# Patient Record
Sex: MALE | Race: WHITE | Employment: OTHER | URBAN - METROPOLITAN AREA
[De-identification: names, ages, dates, MRNs, and addresses within clinical notes are randomized per-mention and may not be internally consistent; named-entity substitution may affect disease eponyms.]

---

## 2020-01-01 ENCOUNTER — APPOINTMENT (OUTPATIENT)
Dept: GENERAL RADIOLOGY | Age: 75
End: 2020-01-01
Attending: EMERGENCY MEDICINE
Payer: MEDICARE

## 2020-01-01 ENCOUNTER — APPOINTMENT (OUTPATIENT)
Dept: MRI IMAGING | Age: 75
End: 2020-01-01
Attending: INTERNAL MEDICINE
Payer: MEDICARE

## 2020-01-01 ENCOUNTER — HOSPITAL ENCOUNTER (OUTPATIENT)
Age: 75
Setting detail: OBSERVATION
Discharge: PSYCHIATRIC OR SUBSTANCE USE FACILITY | End: 2020-12-18
Attending: EMERGENCY MEDICINE | Admitting: INTERNAL MEDICINE
Payer: MEDICARE

## 2020-01-01 ENCOUNTER — APPOINTMENT (OUTPATIENT)
Dept: CT IMAGING | Age: 75
End: 2020-01-01
Attending: EMERGENCY MEDICINE
Payer: MEDICARE

## 2020-01-01 VITALS
RESPIRATION RATE: 18 BRPM | TEMPERATURE: 97.7 F | HEART RATE: 75 BPM | HEIGHT: 73 IN | SYSTOLIC BLOOD PRESSURE: 158 MMHG | WEIGHT: 149.9 LBS | BODY MASS INDEX: 19.87 KG/M2 | OXYGEN SATURATION: 97 % | DIASTOLIC BLOOD PRESSURE: 66 MMHG

## 2020-01-01 DIAGNOSIS — R40.0 SOMNOLENCE: Primary | ICD-10-CM

## 2020-01-01 DIAGNOSIS — R41.82 ALTERED MENTAL STATUS, UNSPECIFIED ALTERED MENTAL STATUS TYPE: ICD-10-CM

## 2020-01-01 LAB
7-AMINOCLONAZEPAM: NEGATIVE NG/ML
ALBUMIN SERPL-MCNC: 2.6 G/DL (ref 3.4–5)
ALBUMIN SERPL-MCNC: 3 G/DL (ref 3.4–5)
ALBUMIN SERPL-MCNC: 3 G/DL (ref 3.4–5)
ALBUMIN/GLOB SERPL: 1 {RATIO} (ref 0.8–1.7)
ALBUMIN/GLOB SERPL: 1 {RATIO} (ref 0.8–1.7)
ALBUMIN/GLOB SERPL: 1.1 {RATIO} (ref 0.8–1.7)
ALP SERPL-CCNC: 56 U/L (ref 45–117)
ALP SERPL-CCNC: 56 U/L (ref 45–117)
ALP SERPL-CCNC: 70 U/L (ref 45–117)
ALPRAZOLAM, 763914: NEGATIVE NG/ML
ALT SERPL-CCNC: 38 U/L (ref 16–61)
ALT SERPL-CCNC: 44 U/L (ref 16–61)
ALT SERPL-CCNC: 67 U/L (ref 16–61)
AMMONIA PLAS-SCNC: 19 UMOL/L (ref 11–32)
AMPHET UR QL SCN: NEGATIVE
AMPHETAMINES SERPL QL SCN: NEGATIVE NG/ML
ANION GAP SERPL CALC-SCNC: 4 MMOL/L (ref 3–18)
ANION GAP SERPL CALC-SCNC: 5 MMOL/L (ref 3–18)
ANION GAP SERPL CALC-SCNC: 6 MMOL/L (ref 3–18)
ANION GAP SERPL CALC-SCNC: 6 MMOL/L (ref 3–18)
APAP SERPL-MCNC: <2 UG/ML (ref 10–30)
APAP SERPL-MCNC: <2 UG/ML (ref 10–30)
APPEARANCE UR: CLEAR
ARTERIAL PATENCY WRIST A: YES
AST SERPL-CCNC: 14 U/L (ref 10–38)
AST SERPL-CCNC: 19 U/L (ref 10–38)
AST SERPL-CCNC: 64 U/L (ref 10–38)
ATRIAL RATE: 77 BPM
BACTERIA SPEC CULT: ABNORMAL
BACTERIA SPEC CULT: NORMAL
BARBITURATES SERPL QL SCN: NEGATIVE UG/ML
BARBITURATES UR QL SCN: NEGATIVE
BASE EXCESS BLD CALC-SCNC: 3 MMOL/L
BASOPHILS # BLD: 0 K/UL (ref 0–0.1)
BASOPHILS NFR BLD: 0 % (ref 0–2)
BDY SITE: ABNORMAL
BENZODIAZ SERPL QL SCN: ABNORMAL NG/ML
BENZODIAZ UR QL: NEGATIVE
BENZODIAZEPINES CONFIRM: POSITIVE
BILIRUB DIRECT SERPL-MCNC: 0.1 MG/DL (ref 0–0.2)
BILIRUB SERPL-MCNC: 0.4 MG/DL (ref 0.2–1)
BILIRUB SERPL-MCNC: 0.6 MG/DL (ref 0.2–1)
BILIRUB SERPL-MCNC: 0.6 MG/DL (ref 0.2–1)
BILIRUB UR QL: NEGATIVE
BODY TEMPERATURE: 96.9
BUN SERPL-MCNC: 15 MG/DL (ref 7–18)
BUN SERPL-MCNC: 15 MG/DL (ref 7–18)
BUN SERPL-MCNC: 18 MG/DL (ref 7–18)
BUN SERPL-MCNC: 19 MG/DL (ref 7–18)
BUN/CREAT SERPL: 19 (ref 12–20)
BUN/CREAT SERPL: 20 (ref 12–20)
BUN/CREAT SERPL: 20 (ref 12–20)
BUN/CREAT SERPL: 23 (ref 12–20)
CALCIUM SERPL-MCNC: 8.3 MG/DL (ref 8.5–10.1)
CALCIUM SERPL-MCNC: 8.5 MG/DL (ref 8.5–10.1)
CALCIUM SERPL-MCNC: 8.5 MG/DL (ref 8.5–10.1)
CALCIUM SERPL-MCNC: 8.7 MG/DL (ref 8.5–10.1)
CALCULATED P AXIS, ECG09: 33 DEGREES
CALCULATED R AXIS, ECG10: 28 DEGREES
CALCULATED T AXIS, ECG11: 66 DEGREES
CANNABINOIDS SERPL QL SCN: NEGATIVE NG/ML
CANNABINOIDS UR QL SCN: NEGATIVE
CHLORDIAZEPOXIDE, 716035: NEGATIVE NG/ML
CHLORIDE SERPL-SCNC: 105 MMOL/L (ref 100–111)
CHLORIDE SERPL-SCNC: 106 MMOL/L (ref 100–111)
CHLORIDE SERPL-SCNC: 107 MMOL/L (ref 100–111)
CHLORIDE SERPL-SCNC: 109 MMOL/L (ref 100–111)
CHOLEST SERPL-MCNC: 162 MG/DL
CK MB CFR SERPL CALC: NORMAL % (ref 0–4)
CK MB SERPL-MCNC: <1 NG/ML (ref 5–25)
CK SERPL-CCNC: 65 U/L (ref 39–308)
CLONAZEPAM, 763916: NEGATIVE NG/ML
CO2 SERPL-SCNC: 24 MMOL/L (ref 21–32)
CO2 SERPL-SCNC: 25 MMOL/L (ref 21–32)
CO2 SERPL-SCNC: 27 MMOL/L (ref 21–32)
CO2 SERPL-SCNC: 28 MMOL/L (ref 21–32)
COCAINE UR QL SCN: NEGATIVE
COCAINE+BZE SERPL QL SCN: NEGATIVE NG/ML
COLOR UR: YELLOW
COVID-19 RAPID TEST, COVR: NOT DETECTED
CREAT SERPL-MCNC: 0.76 MG/DL (ref 0.6–1.3)
CREAT SERPL-MCNC: 0.78 MG/DL (ref 0.6–1.3)
CREAT SERPL-MCNC: 0.79 MG/DL (ref 0.6–1.3)
CREAT SERPL-MCNC: 0.97 MG/DL (ref 0.6–1.3)
DESALKYLFLURAZEPAM, 767601: NEGATIVE NG/ML
DESMETHYLCHLORDIAZEPOXIDE, 810910: NEGATIVE NG/ML
DESMETHYLDIAZEPAM, RMBN16: NEGATIVE NG/ML
DIAGNOSIS, 93000: NORMAL
DIAZEPAM, 810905: NEGATIVE NG/ML
DIFFERENTIAL METHOD BLD: ABNORMAL
EOSINOPHIL # BLD: 0.1 K/UL (ref 0–0.4)
EOSINOPHIL # BLD: 0.1 K/UL (ref 0–0.4)
EOSINOPHIL # BLD: 0.2 K/UL (ref 0–0.4)
EOSINOPHIL NFR BLD: 1 % (ref 0–5)
EOSINOPHIL NFR BLD: 1 % (ref 0–5)
EOSINOPHIL NFR BLD: 2 % (ref 0–5)
ERYTHROCYTE [DISTWIDTH] IN BLOOD BY AUTOMATED COUNT: 14.1 % (ref 11.6–14.5)
ERYTHROCYTE [DISTWIDTH] IN BLOOD BY AUTOMATED COUNT: 14.3 % (ref 11.6–14.5)
ERYTHROCYTE [DISTWIDTH] IN BLOOD BY AUTOMATED COUNT: 14.4 % (ref 11.6–14.5)
ERYTHROCYTE [DISTWIDTH] IN BLOOD BY AUTOMATED COUNT: 14.6 % (ref 11.6–14.5)
EST. AVERAGE GLUCOSE BLD GHB EST-MCNC: 114 MG/DL
ETHANOL SERPL-MCNC: <3 MG/DL (ref 0–3)
FLURAZEPAM, 790417: NEGATIVE NG/ML
GAS FLOW.O2 O2 DELIVERY SYS: ABNORMAL L/MIN
GAS FLOW.O2 SETTING OXYMISER: 4 L/M
GLOBULIN SER CALC-MCNC: 2.5 G/DL (ref 2–4)
GLOBULIN SER CALC-MCNC: 2.7 G/DL (ref 2–4)
GLOBULIN SER CALC-MCNC: 2.9 G/DL (ref 2–4)
GLUCOSE BLD STRIP.AUTO-MCNC: 117 MG/DL (ref 70–110)
GLUCOSE BLD STRIP.AUTO-MCNC: 118 MG/DL (ref 70–110)
GLUCOSE BLD STRIP.AUTO-MCNC: 121 MG/DL (ref 70–110)
GLUCOSE BLD STRIP.AUTO-MCNC: 121 MG/DL (ref 70–110)
GLUCOSE BLD STRIP.AUTO-MCNC: 125 MG/DL (ref 70–110)
GLUCOSE BLD STRIP.AUTO-MCNC: 127 MG/DL (ref 70–110)
GLUCOSE BLD STRIP.AUTO-MCNC: 128 MG/DL (ref 70–110)
GLUCOSE BLD STRIP.AUTO-MCNC: 132 MG/DL (ref 70–110)
GLUCOSE BLD STRIP.AUTO-MCNC: 136 MG/DL (ref 70–110)
GLUCOSE BLD STRIP.AUTO-MCNC: 143 MG/DL (ref 70–110)
GLUCOSE SERPL-MCNC: 109 MG/DL (ref 74–99)
GLUCOSE SERPL-MCNC: 111 MG/DL (ref 74–99)
GLUCOSE SERPL-MCNC: 91 MG/DL (ref 74–99)
GLUCOSE SERPL-MCNC: 98 MG/DL (ref 74–99)
GLUCOSE UR STRIP.AUTO-MCNC: NEGATIVE MG/DL
GRAM STN SPEC: ABNORMAL
GRAM STN SPEC: ABNORMAL
HBA1C MFR BLD: 5.6 % (ref 4.2–5.6)
HCO3 BLD-SCNC: 28.4 MMOL/L (ref 22–26)
HCT VFR BLD AUTO: 35.8 % (ref 36–48)
HCT VFR BLD AUTO: 36.1 % (ref 36–48)
HCT VFR BLD AUTO: 38.6 % (ref 36–48)
HCT VFR BLD AUTO: 39.1 % (ref 36–48)
HDLC SERPL-MCNC: 65 MG/DL (ref 40–60)
HDLC SERPL: 2.5 {RATIO} (ref 0–5)
HDSCOM,HDSCOM: NORMAL
HGB BLD-MCNC: 11.5 G/DL (ref 13–16)
HGB BLD-MCNC: 11.7 G/DL (ref 13–16)
HGB BLD-MCNC: 12.6 G/DL (ref 13–16)
HGB BLD-MCNC: 12.6 G/DL (ref 13–16)
HGB UR QL STRIP: NEGATIVE
INR PPP: 1 (ref 0.8–1.2)
INR PPP: 1.2 (ref 0.8–1.2)
KETONES UR QL STRIP.AUTO: NEGATIVE MG/DL
LACTATE BLD-SCNC: 0.61 MMOL/L (ref 0.4–2)
LDLC SERPL CALC-MCNC: 77.6 MG/DL (ref 0–100)
LEUKOCYTE ESTERASE UR QL STRIP.AUTO: NEGATIVE
LIPID PROFILE,FLP: ABNORMAL
LORAZEPAM, 763912: 78.8 NG/ML
LYMPHOCYTES # BLD: 1.4 K/UL (ref 0.9–3.6)
LYMPHOCYTES # BLD: 1.5 K/UL (ref 0.9–3.6)
LYMPHOCYTES # BLD: 2.5 K/UL (ref 0.9–3.6)
LYMPHOCYTES NFR BLD: 12 % (ref 21–52)
LYMPHOCYTES NFR BLD: 14 % (ref 21–52)
LYMPHOCYTES NFR BLD: 29 % (ref 21–52)
MAGNESIUM SERPL-MCNC: 2 MG/DL (ref 1.6–2.6)
MAGNESIUM SERPL-MCNC: 2.6 MG/DL (ref 1.6–2.6)
MCH RBC QN AUTO: 29.8 PG (ref 24–34)
MCH RBC QN AUTO: 30.1 PG (ref 24–34)
MCH RBC QN AUTO: 30.3 PG (ref 24–34)
MCH RBC QN AUTO: 30.4 PG (ref 24–34)
MCHC RBC AUTO-ENTMCNC: 32.1 G/DL (ref 31–37)
MCHC RBC AUTO-ENTMCNC: 32.2 G/DL (ref 31–37)
MCHC RBC AUTO-ENTMCNC: 32.4 G/DL (ref 31–37)
MCHC RBC AUTO-ENTMCNC: 32.6 G/DL (ref 31–37)
MCV RBC AUTO: 91.9 FL (ref 74–97)
MCV RBC AUTO: 93.2 FL (ref 74–97)
MCV RBC AUTO: 93.7 FL (ref 74–97)
MCV RBC AUTO: 94 FL (ref 74–97)
MEPERIDINE, DR296L: NEGATIVE NG/ML
METHADONE SERPL QL SCN: NEGATIVE NG/ML
METHADONE UR QL: NEGATIVE
MIDAZOLAM, 763922: NEGATIVE NG/ML
MONOCYTES # BLD: 0.5 K/UL (ref 0.05–1.2)
MONOCYTES # BLD: 0.8 K/UL (ref 0.05–1.2)
MONOCYTES # BLD: 0.9 K/UL (ref 0.05–1.2)
MONOCYTES NFR BLD: 4 % (ref 3–10)
MONOCYTES NFR BLD: 9 % (ref 3–10)
MONOCYTES NFR BLD: 9 % (ref 3–10)
NEUTS SEG # BLD: 5.2 K/UL (ref 1.8–8)
NEUTS SEG # BLD: 8.4 K/UL (ref 1.8–8)
NEUTS SEG # BLD: 9.9 K/UL (ref 1.8–8)
NEUTS SEG NFR BLD: 60 % (ref 40–73)
NEUTS SEG NFR BLD: 76 % (ref 40–73)
NEUTS SEG NFR BLD: 83 % (ref 40–73)
NITRITE UR QL STRIP.AUTO: NEGATIVE
OPIATES SERPL QL SCN: NEGATIVE NG/ML
OPIATES UR QL: NEGATIVE
OPIATES, 790423: NEGATIVE NG/ML
OXAZEPAM CONFIRM, 763908: NEGATIVE NG/ML
OXYCODONE, 790407: NEGATIVE NG/ML
P-R INTERVAL, ECG05: 136 MS
PCO2 BLD: 47.4 MMHG (ref 35–45)
PCP SERPL QL SCN: NEGATIVE NG/ML
PCP UR QL: NEGATIVE
PH BLD: 7.38 [PH] (ref 7.35–7.45)
PH UR STRIP: 7.5 [PH] (ref 5–8)
PHOSPHATE SERPL-MCNC: 3.5 MG/DL (ref 2.5–4.9)
PLATELET # BLD AUTO: 252 K/UL (ref 135–420)
PLATELET # BLD AUTO: 259 K/UL (ref 135–420)
PLATELET # BLD AUTO: 271 K/UL (ref 135–420)
PLATELET # BLD AUTO: 287 K/UL (ref 135–420)
PMV BLD AUTO: 9.4 FL (ref 9.2–11.8)
PMV BLD AUTO: 9.6 FL (ref 9.2–11.8)
PMV BLD AUTO: 9.7 FL (ref 9.2–11.8)
PMV BLD AUTO: 9.9 FL (ref 9.2–11.8)
PO2 BLD: 221 MMHG (ref 80–100)
POTASSIUM SERPL-SCNC: 3.7 MMOL/L (ref 3.5–5.5)
POTASSIUM SERPL-SCNC: 3.9 MMOL/L (ref 3.5–5.5)
POTASSIUM SERPL-SCNC: 4 MMOL/L (ref 3.5–5.5)
POTASSIUM SERPL-SCNC: 4.3 MMOL/L (ref 3.5–5.5)
PROPOXYPH SERPL QL SCN: NEGATIVE NG/ML
PROT SERPL-MCNC: 5.1 G/DL (ref 6.4–8.2)
PROT SERPL-MCNC: 5.7 G/DL (ref 6.4–8.2)
PROT SERPL-MCNC: 5.9 G/DL (ref 6.4–8.2)
PROT UR STRIP-MCNC: NEGATIVE MG/DL
PROTHROMBIN TIME: 13.2 SEC (ref 11.5–15.2)
PROTHROMBIN TIME: 14.7 SEC (ref 11.5–15.2)
Q-T INTERVAL, ECG07: 390 MS
QRS DURATION, ECG06: 94 MS
QTC CALCULATION (BEZET), ECG08: 441 MS
RBC # BLD AUTO: 3.82 M/UL (ref 4.7–5.5)
RBC # BLD AUTO: 3.93 M/UL (ref 4.7–5.5)
RBC # BLD AUTO: 4.14 M/UL (ref 4.7–5.5)
RBC # BLD AUTO: 4.16 M/UL (ref 4.7–5.5)
SALICYLATES SERPL-MCNC: <1.7 MG/DL (ref 2.8–20)
SAO2 % BLD: 100 % (ref 92–97)
SERVICE CMNT-IMP: ABNORMAL
SERVICE CMNT-IMP: ABNORMAL
SERVICE CMNT-IMP: NORMAL
SODIUM SERPL-SCNC: 136 MMOL/L (ref 136–145)
SODIUM SERPL-SCNC: 138 MMOL/L (ref 136–145)
SODIUM SERPL-SCNC: 139 MMOL/L (ref 136–145)
SODIUM SERPL-SCNC: 139 MMOL/L (ref 136–145)
SOURCE, COVRS: NORMAL
SP GR UR REFRACTOMETRY: 1.01 (ref 1–1.03)
SPECIMEN TYPE, XMCV1T: NORMAL
SPECIMEN TYPE: ABNORMAL
TEMAZEPAM, 763918: NEGATIVE NG/ML
TOTAL RESP. RATE, ITRR: 12
TRAMADOL, DR297L: NEGATIVE NG/ML
TRIAZOLAM, 763920: NEGATIVE NG/ML
TRIGL SERPL-MCNC: 97 MG/DL (ref ?–150)
TROPONIN I SERPL-MCNC: <0.02 NG/ML (ref 0–0.04)
TSH SERPL DL<=0.05 MIU/L-ACNC: 0.78 UIU/ML (ref 0.36–3.74)
UROBILINOGEN UR QL STRIP.AUTO: 0.2 EU/DL (ref 0.2–1)
VENTRICULAR RATE, ECG03: 77 BPM
VLDLC SERPL CALC-MCNC: 19.4 MG/DL
WBC # BLD AUTO: 10.9 K/UL (ref 4.6–13.2)
WBC # BLD AUTO: 11.9 K/UL (ref 4.6–13.2)
WBC # BLD AUTO: 8.7 K/UL (ref 4.6–13.2)
WBC # BLD AUTO: 8.7 K/UL (ref 4.6–13.2)

## 2020-01-01 PROCEDURE — 80048 BASIC METABOLIC PNL TOTAL CA: CPT

## 2020-01-01 PROCEDURE — 80307 DRUG TEST PRSMV CHEM ANLYZR: CPT

## 2020-01-01 PROCEDURE — 74011250637 HC RX REV CODE- 250/637: Performed by: INTERNAL MEDICINE

## 2020-01-01 PROCEDURE — 85027 COMPLETE CBC AUTOMATED: CPT

## 2020-01-01 PROCEDURE — 36415 COLL VENOUS BLD VENIPUNCTURE: CPT

## 2020-01-01 PROCEDURE — 87635 SARS-COV-2 COVID-19 AMP PRB: CPT

## 2020-01-01 PROCEDURE — 80053 COMPREHEN METABOLIC PANEL: CPT

## 2020-01-01 PROCEDURE — 87040 BLOOD CULTURE FOR BACTERIA: CPT

## 2020-01-01 PROCEDURE — 74011250636 HC RX REV CODE- 250/636: Performed by: INTERNAL MEDICINE

## 2020-01-01 PROCEDURE — 99218 HC RM OBSERVATION: CPT

## 2020-01-01 PROCEDURE — 74011250636 HC RX REV CODE- 250/636: Performed by: HOSPITALIST

## 2020-01-01 PROCEDURE — 83036 HEMOGLOBIN GLYCOSYLATED A1C: CPT

## 2020-01-01 PROCEDURE — 70551 MRI BRAIN STEM W/O DYE: CPT

## 2020-01-01 PROCEDURE — 71045 X-RAY EXAM CHEST 1 VIEW: CPT

## 2020-01-01 PROCEDURE — 82962 GLUCOSE BLOOD TEST: CPT

## 2020-01-01 PROCEDURE — 84100 ASSAY OF PHOSPHORUS: CPT

## 2020-01-01 PROCEDURE — 65660000001 HC RM ICU INTERMED STEPDOWN

## 2020-01-01 PROCEDURE — 80347 BENZODIAZEPINES 13 OR MORE: CPT

## 2020-01-01 PROCEDURE — 82140 ASSAY OF AMMONIA: CPT

## 2020-01-01 PROCEDURE — 83735 ASSAY OF MAGNESIUM: CPT

## 2020-01-01 PROCEDURE — 36600 WITHDRAWAL OF ARTERIAL BLOOD: CPT

## 2020-01-01 PROCEDURE — 83605 ASSAY OF LACTIC ACID: CPT

## 2020-01-01 PROCEDURE — 85610 PROTHROMBIN TIME: CPT

## 2020-01-01 PROCEDURE — 2709999900 HC NON-CHARGEABLE SUPPLY

## 2020-01-01 PROCEDURE — 77030021352 HC CBL LD SYS DISP COVD -B

## 2020-01-01 PROCEDURE — 84443 ASSAY THYROID STIM HORMONE: CPT

## 2020-01-01 PROCEDURE — 90471 IMMUNIZATION ADMIN: CPT

## 2020-01-01 PROCEDURE — 65660000000 HC RM CCU STEPDOWN

## 2020-01-01 PROCEDURE — 85025 COMPLETE CBC W/AUTO DIFF WBC: CPT

## 2020-01-01 PROCEDURE — 81003 URINALYSIS AUTO W/O SCOPE: CPT

## 2020-01-01 PROCEDURE — 82550 ASSAY OF CK (CPK): CPT

## 2020-01-01 PROCEDURE — 96372 THER/PROPH/DIAG INJ SC/IM: CPT

## 2020-01-01 PROCEDURE — 70450 CT HEAD/BRAIN W/O DYE: CPT

## 2020-01-01 PROCEDURE — 97162 PT EVAL MOD COMPLEX 30 MIN: CPT

## 2020-01-01 PROCEDURE — 99285 EMERGENCY DEPT VISIT HI MDM: CPT

## 2020-01-01 PROCEDURE — 87077 CULTURE AEROBIC IDENTIFY: CPT

## 2020-01-01 PROCEDURE — 65270000029 HC RM PRIVATE

## 2020-01-01 PROCEDURE — 93005 ELECTROCARDIOGRAM TRACING: CPT

## 2020-01-01 PROCEDURE — 51702 INSERT TEMP BLADDER CATH: CPT

## 2020-01-01 PROCEDURE — 74011250637 HC RX REV CODE- 250/637: Performed by: HOSPITALIST

## 2020-01-01 PROCEDURE — 90686 IIV4 VACC NO PRSV 0.5 ML IM: CPT | Performed by: INTERNAL MEDICINE

## 2020-01-01 PROCEDURE — 80061 LIPID PANEL: CPT

## 2020-01-01 PROCEDURE — 82803 BLOOD GASES ANY COMBINATION: CPT

## 2020-01-01 PROCEDURE — 80076 HEPATIC FUNCTION PANEL: CPT

## 2020-01-01 RX ORDER — DOCUSATE SODIUM 100 MG/1
200 CAPSULE, LIQUID FILLED ORAL DAILY
Status: DISCONTINUED | OUTPATIENT
Start: 2020-01-01 | End: 2020-01-01 | Stop reason: HOSPADM

## 2020-01-01 RX ORDER — PANTOPRAZOLE SODIUM 40 MG/1
40 TABLET, DELAYED RELEASE ORAL
Status: DISCONTINUED | OUTPATIENT
Start: 2020-01-01 | End: 2020-01-01 | Stop reason: HOSPADM

## 2020-01-01 RX ORDER — INSULIN LISPRO 100 [IU]/ML
INJECTION, SOLUTION INTRAVENOUS; SUBCUTANEOUS
Status: DISCONTINUED | OUTPATIENT
Start: 2020-01-01 | End: 2020-01-01 | Stop reason: HOSPADM

## 2020-01-01 RX ORDER — SODIUM CHLORIDE 9 MG/ML
125 INJECTION, SOLUTION INTRAVENOUS CONTINUOUS
Status: DISPENSED | OUTPATIENT
Start: 2020-01-01 | End: 2020-01-01

## 2020-01-01 RX ORDER — LORAZEPAM 1 MG/1
1 TABLET ORAL ONCE
Status: COMPLETED | OUTPATIENT
Start: 2020-01-01 | End: 2020-01-01

## 2020-01-01 RX ORDER — GUAIFENESIN 100 MG/5ML
81 LIQUID (ML) ORAL DAILY
Status: DISCONTINUED | OUTPATIENT
Start: 2020-01-01 | End: 2020-01-01 | Stop reason: HOSPADM

## 2020-01-01 RX ORDER — SODIUM CHLORIDE 0.9 % (FLUSH) 0.9 %
5-40 SYRINGE (ML) INJECTION AS NEEDED
Status: DISCONTINUED | OUTPATIENT
Start: 2020-01-01 | End: 2020-01-01 | Stop reason: HOSPADM

## 2020-01-01 RX ORDER — ATORVASTATIN CALCIUM 10 MG/1
10 TABLET, FILM COATED ORAL
Qty: 30 TAB | Refills: 0 | Status: SHIPPED | OUTPATIENT
Start: 2020-01-01 | End: 2021-01-17

## 2020-01-01 RX ORDER — MELATONIN
1000 DAILY
COMMUNITY

## 2020-01-01 RX ORDER — GUAIFENESIN 100 MG/5ML
81 LIQUID (ML) ORAL DAILY
Status: SHIPPED | COMMUNITY
Start: 2020-01-01

## 2020-01-01 RX ORDER — DEXTROSE MONOHYDRATE 25 G/50ML
25-50 INJECTION, SOLUTION INTRAVENOUS AS NEEDED
Status: DISCONTINUED | OUTPATIENT
Start: 2020-01-01 | End: 2020-01-01 | Stop reason: SDUPTHER

## 2020-01-01 RX ORDER — ATORVASTATIN CALCIUM 10 MG/1
10 TABLET, FILM COATED ORAL
Status: DISCONTINUED | OUTPATIENT
Start: 2020-01-01 | End: 2020-01-01 | Stop reason: HOSPADM

## 2020-01-01 RX ORDER — SODIUM CHLORIDE 9 MG/ML
500 INJECTION, SOLUTION INTRAVENOUS ONCE
Status: COMPLETED | OUTPATIENT
Start: 2020-01-01 | End: 2020-01-01

## 2020-01-01 RX ORDER — ASPIRIN 325 MG/1
100 TABLET, FILM COATED ORAL DAILY
Qty: 15 TAB | Refills: 0 | Status: SHIPPED | OUTPATIENT
Start: 2020-01-01

## 2020-01-01 RX ORDER — ENOXAPARIN SODIUM 100 MG/ML
40 INJECTION SUBCUTANEOUS EVERY 24 HOURS
Status: DISCONTINUED | OUTPATIENT
Start: 2020-01-01 | End: 2020-01-01 | Stop reason: HOSPADM

## 2020-01-01 RX ORDER — ASPIRIN 325 MG/1
100 TABLET, FILM COATED ORAL DAILY
Status: DISCONTINUED | OUTPATIENT
Start: 2020-01-01 | End: 2020-01-01 | Stop reason: HOSPADM

## 2020-01-01 RX ORDER — ESCITALOPRAM OXALATE 10 MG/1
10 TABLET ORAL
COMMUNITY

## 2020-01-01 RX ORDER — ESCITALOPRAM OXALATE 10 MG/1
10 TABLET ORAL
Status: DISCONTINUED | OUTPATIENT
Start: 2020-01-01 | End: 2020-01-01 | Stop reason: HOSPADM

## 2020-01-01 RX ORDER — ACETAMINOPHEN 650 MG/1
650 SUPPOSITORY RECTAL
Status: DISCONTINUED | OUTPATIENT
Start: 2020-01-01 | End: 2020-01-01 | Stop reason: HOSPADM

## 2020-01-01 RX ORDER — ACETAMINOPHEN 325 MG/1
650 TABLET ORAL
Status: DISCONTINUED | OUTPATIENT
Start: 2020-01-01 | End: 2020-01-01 | Stop reason: HOSPADM

## 2020-01-01 RX ORDER — SODIUM CHLORIDE 0.9 % (FLUSH) 0.9 %
5-40 SYRINGE (ML) INJECTION EVERY 8 HOURS
Status: DISCONTINUED | OUTPATIENT
Start: 2020-01-01 | End: 2020-01-01 | Stop reason: HOSPADM

## 2020-01-01 RX ORDER — MELATONIN
1000 DAILY
Status: DISCONTINUED | OUTPATIENT
Start: 2020-01-01 | End: 2020-01-01 | Stop reason: HOSPADM

## 2020-01-01 RX ORDER — SODIUM CHLORIDE 9 MG/ML
125 INJECTION, SOLUTION INTRAVENOUS CONTINUOUS
Status: DISCONTINUED | OUTPATIENT
Start: 2020-01-01 | End: 2020-01-01

## 2020-01-01 RX ORDER — MAGNESIUM SULFATE 100 %
4 CRYSTALS MISCELLANEOUS AS NEEDED
Status: DISCONTINUED | OUTPATIENT
Start: 2020-01-01 | End: 2020-01-01 | Stop reason: SDUPTHER

## 2020-01-01 RX ORDER — SODIUM CHLORIDE 9 MG/ML
75 INJECTION, SOLUTION INTRAVENOUS CONTINUOUS
Status: DISPENSED | OUTPATIENT
Start: 2020-01-01 | End: 2020-01-01

## 2020-01-01 RX ORDER — TAMSULOSIN HYDROCHLORIDE 0.4 MG/1
0.4 CAPSULE ORAL
Status: DISCONTINUED | OUTPATIENT
Start: 2020-01-01 | End: 2020-01-01 | Stop reason: HOSPADM

## 2020-01-01 RX ORDER — PANTOPRAZOLE SODIUM 40 MG/1
40 TABLET, DELAYED RELEASE ORAL
Qty: 30 TAB | Refills: 0 | Status: SHIPPED | OUTPATIENT
Start: 2020-01-01 | End: 2021-01-18

## 2020-01-01 RX ORDER — DEXTROSE MONOHYDRATE 25 G/50ML
25-50 INJECTION, SOLUTION INTRAVENOUS AS NEEDED
Status: DISCONTINUED | OUTPATIENT
Start: 2020-01-01 | End: 2020-01-01 | Stop reason: HOSPADM

## 2020-01-01 RX ORDER — INSULIN LISPRO 100 [IU]/ML
INJECTION, SOLUTION INTRAVENOUS; SUBCUTANEOUS
Qty: 1 VIAL | Refills: 0 | Status: SHIPPED
Start: 2020-01-01

## 2020-01-01 RX ORDER — MAGNESIUM SULFATE 100 %
4 CRYSTALS MISCELLANEOUS AS NEEDED
Status: DISCONTINUED | OUTPATIENT
Start: 2020-01-01 | End: 2020-01-01 | Stop reason: HOSPADM

## 2020-01-01 RX ORDER — ONDANSETRON 2 MG/ML
4 INJECTION INTRAMUSCULAR; INTRAVENOUS
Status: DISCONTINUED | OUTPATIENT
Start: 2020-01-01 | End: 2020-01-01 | Stop reason: HOSPADM

## 2020-01-01 RX ADMIN — SODIUM CHLORIDE 500 ML/HR: 900 INJECTION, SOLUTION INTRAVENOUS at 06:07

## 2020-01-01 RX ADMIN — MULTIPLE VITAMINS W/ MINERALS TAB 1 TABLET: TAB at 11:25

## 2020-01-01 RX ADMIN — SODIUM CHLORIDE 125 ML/HR: 900 INJECTION, SOLUTION INTRAVENOUS at 00:55

## 2020-01-01 RX ADMIN — DOCUSATE SODIUM 200 MG: 100 CAPSULE, LIQUID FILLED ORAL at 08:45

## 2020-01-01 RX ADMIN — Medication 10 ML: at 22:25

## 2020-01-01 RX ADMIN — MULTIPLE VITAMINS W/ MINERALS TAB 1 TABLET: TAB at 08:45

## 2020-01-01 RX ADMIN — Medication 10 ML: at 21:33

## 2020-01-01 RX ADMIN — ESCITALOPRAM OXALATE 10 MG: 10 TABLET ORAL at 21:45

## 2020-01-01 RX ADMIN — CHOLECALCIFEROL TAB 25 MCG (1000 UNIT) 1 TABLET: 25 TAB at 08:18

## 2020-01-01 RX ADMIN — TAMSULOSIN HYDROCHLORIDE 0.4 MG: 0.4 CAPSULE ORAL at 17:55

## 2020-01-01 RX ADMIN — ENOXAPARIN SODIUM 40 MG: 40 INJECTION SUBCUTANEOUS at 14:40

## 2020-01-01 RX ADMIN — ACETAMINOPHEN 650 MG: 325 TABLET, FILM COATED ORAL at 23:01

## 2020-01-01 RX ADMIN — DOCUSATE SODIUM 200 MG: 100 CAPSULE, LIQUID FILLED ORAL at 08:43

## 2020-01-01 RX ADMIN — TAMSULOSIN HYDROCHLORIDE 0.4 MG: 0.4 CAPSULE ORAL at 17:12

## 2020-01-01 RX ADMIN — INFLUENZA VIRUS VACCINE 0.5 ML: 15; 15; 15; 15 SUSPENSION INTRAMUSCULAR at 11:17

## 2020-01-01 RX ADMIN — Medication 10 ML: at 05:53

## 2020-01-01 RX ADMIN — Medication 100 MG: at 11:25

## 2020-01-01 RX ADMIN — Medication 10 ML: at 22:19

## 2020-01-01 RX ADMIN — ENOXAPARIN SODIUM 40 MG: 40 INJECTION SUBCUTANEOUS at 14:46

## 2020-01-01 RX ADMIN — Medication 10 ML: at 14:32

## 2020-01-01 RX ADMIN — Medication 10 ML: at 14:59

## 2020-01-01 RX ADMIN — TAMSULOSIN HYDROCHLORIDE 0.4 MG: 0.4 CAPSULE ORAL at 18:09

## 2020-01-01 RX ADMIN — ASPIRIN 81 MG CHEWABLE TABLET 81 MG: 81 TABLET CHEWABLE at 08:45

## 2020-01-01 RX ADMIN — ATORVASTATIN CALCIUM 10 MG: 10 TABLET, FILM COATED ORAL at 21:33

## 2020-01-01 RX ADMIN — PANTOPRAZOLE SODIUM 40 MG: 40 TABLET, DELAYED RELEASE ORAL at 08:45

## 2020-01-01 RX ADMIN — LORAZEPAM 1 MG: 1 TABLET ORAL at 22:57

## 2020-01-01 RX ADMIN — Medication 100 MG: at 08:45

## 2020-01-01 RX ADMIN — PANTOPRAZOLE SODIUM 40 MG: 40 TABLET, DELAYED RELEASE ORAL at 08:18

## 2020-01-01 RX ADMIN — DOCUSATE SODIUM 200 MG: 100 CAPSULE, LIQUID FILLED ORAL at 08:18

## 2020-01-01 RX ADMIN — ESCITALOPRAM OXALATE 10 MG: 10 TABLET ORAL at 21:33

## 2020-01-01 RX ADMIN — Medication 10 ML: at 23:23

## 2020-01-01 RX ADMIN — ENOXAPARIN SODIUM 40 MG: 40 INJECTION SUBCUTANEOUS at 22:15

## 2020-01-01 RX ADMIN — ESCITALOPRAM OXALATE 10 MG: 10 TABLET ORAL at 22:25

## 2020-01-01 RX ADMIN — PANTOPRAZOLE SODIUM 40 MG: 40 TABLET, DELAYED RELEASE ORAL at 08:43

## 2020-01-01 RX ADMIN — Medication 10 ML: at 06:31

## 2020-01-01 RX ADMIN — CHOLECALCIFEROL TAB 25 MCG (1000 UNIT) 1 TABLET: 25 TAB at 08:43

## 2020-01-01 RX ADMIN — TAMSULOSIN HYDROCHLORIDE 0.4 MG: 0.4 CAPSULE ORAL at 17:28

## 2020-01-01 RX ADMIN — SODIUM CHLORIDE 75 ML/HR: 900 INJECTION, SOLUTION INTRAVENOUS at 14:10

## 2020-01-01 RX ADMIN — Medication 10 ML: at 14:38

## 2020-01-01 RX ADMIN — ASPIRIN 81 MG CHEWABLE TABLET 81 MG: 81 TABLET CHEWABLE at 10:52

## 2020-01-01 RX ADMIN — CHOLECALCIFEROL TAB 25 MCG (1000 UNIT) 1 TABLET: 25 TAB at 08:16

## 2020-01-01 RX ADMIN — CHOLECALCIFEROL TAB 25 MCG (1000 UNIT) 1 TABLET: 25 TAB at 08:45

## 2020-01-01 RX ADMIN — DOCUSATE SODIUM 200 MG: 100 CAPSULE, LIQUID FILLED ORAL at 08:16

## 2020-01-01 RX ADMIN — ASPIRIN 81 MG CHEWABLE TABLET 81 MG: 81 TABLET CHEWABLE at 08:18

## 2020-01-01 RX ADMIN — PANTOPRAZOLE SODIUM 40 MG: 40 TABLET, DELAYED RELEASE ORAL at 08:16

## 2020-01-01 RX ADMIN — Medication 40 ML: at 14:00

## 2020-01-01 RX ADMIN — ATORVASTATIN CALCIUM 10 MG: 10 TABLET, FILM COATED ORAL at 22:25

## 2020-01-01 RX ADMIN — ENOXAPARIN SODIUM 40 MG: 40 INJECTION SUBCUTANEOUS at 15:44

## 2020-01-01 RX ADMIN — SODIUM CHLORIDE 75 ML/HR: 900 INJECTION, SOLUTION INTRAVENOUS at 17:49

## 2020-01-01 RX ADMIN — Medication 10 ML: at 06:52

## 2020-12-13 PROBLEM — R41.82 ALTERED MENTAL STATUS, UNSPECIFIED: Status: ACTIVE | Noted: 2020-01-01

## 2020-12-14 PROBLEM — I10 HTN (HYPERTENSION): Status: ACTIVE | Noted: 2020-01-01

## 2020-12-14 PROBLEM — F32.A DEPRESSION: Status: ACTIVE | Noted: 2020-01-01

## 2020-12-14 PROBLEM — E11.9 TYPE II DIABETES MELLITUS (HCC): Status: ACTIVE | Noted: 2020-01-01

## 2020-12-14 PROBLEM — M81.0 OSTEOPOROSIS: Status: ACTIVE | Noted: 2020-01-01

## 2020-12-14 PROBLEM — Z91.51 HISTORY OF ATTEMPTED SUICIDE: Status: ACTIVE | Noted: 2020-01-01

## 2020-12-14 NOTE — ED NOTES
Patient side lying asleep but wakens to head rub and name. Mumbled something then went back to sleep. Waiting on bed assignment

## 2020-12-14 NOTE — ED NOTES
Patient's home medications in a [facility] bag are in the BLUE BIN inside med prep room. AM nurse and charge made aware.

## 2020-12-14 NOTE — PROGRESS NOTES
attempted to complete the initial Spiritual Assessment of the patient in bed 4 of the emergency room but found him resting peacefully at this time, and unable to communicate now. Patient does not have any Latter-day/cultural needs that will affect patients preferences in health care. Chaplains will continue to follow and will provide pastoral care on an as needed/requested basis. Amada Daniel Board Certified [employer] Spiritual Care Department [phone]

## 2020-12-14 NOTE — PROGRESS NOTES
Problem: Falls - Risk of 
Goal: *Absence of Falls Description: Document Raquel Morales Fall Risk and appropriate interventions in the flowsheet. Outcome: Progressing Towards Goal 
Note: Fall Risk Interventions: 
  
 
Mentation Interventions: Adequate sleep, hydration, pain control, Bed/chair exit alarm Elimination Interventions: Bed/chair exit alarm, Call light in reach Problem: Patient Education: Go to Patient Education Activity Goal: Patient/Family Education Outcome: Progressing Towards Goal 
  
Problem: Infection - Risk of, Urinary Catheter-Associated Urinary Tract Infection Goal: *Absence of infection signs and symptoms Outcome: Progressing Towards Goal 
  
Problem: Patient Education: Go to Patient Education Activity Goal: Patient/Family Education Outcome: Progressing Towards Goal 
  
Problem: Suicide Goal: *STG: Remains safe in hospital 
Outcome: Progressing Towards Goal 
Goal: *STG: Seeks staff when feelings of self harm or harm towards others arise Outcome: Progressing Towards Goal 
Goal: *STG: Attends activities and groups Outcome: Progressing Towards Goal 
Goal: *STG:  Verbalizes alternative ways of dealing with maladaptive feelings/behaviors Outcome: Progressing Towards Goal 
Goal: *STG/LTG: Complies with medication therapy Outcome: Progressing Towards Goal 
Goal: *STG/LTG: No longer expresses self destructive or suicidal thoughts Outcome: Progressing Towards Goal 
Goal: *LTG:  Identifies available community resources Outcome: Progressing Towards Goal 
Goal: *LTG:  Develops proactive suicide prevention plan Outcome: Progressing Towards Goal 
Goal: Interventions Outcome: Progressing Towards Goal 
  
Problem: Patient Education: Go to Patient Education Activity Goal: Patient/Family Education Outcome: Progressing Towards Goal 
  
Problem: Pain Goal: *Control of Pain Outcome: Progressing Towards Goal 
Goal: *PALLIATIVE CARE:  Alleviation of Pain Outcome: Progressing Towards Goal 
  
Problem: Patient Education: Go to Patient Education Activity Goal: Patient/Family Education Outcome: Progressing Towards Goal 
  
Problem: Pressure Injury - Risk of 
Goal: *Prevention of pressure injury Description: Document Narinder Scale and appropriate interventions in the flowsheet. Outcome: Progressing Towards Goal 
  
Problem: Patient Education: Go to Patient Education Activity Goal: Patient/Family Education Outcome: Progressing Towards Goal

## 2020-12-14 NOTE — PROGRESS NOTES
Problem: Discharge Planning Goal: *Discharge to safe environment Outcome: Progressing Towards Goal 
 Plan dependent on psych consult when med cleared. Reason for Admission:   ams RUR Score:           
        
Plan for utilizing home health:     no  
 
PCP: First and Last name:  Milla Luis Name of Practice:  
 Are you a current patient: Yes/No: yes Approximate date of last visit: ? 
 Can you participate in a virtual visit with your PCP:  
                 
Current Advanced Directive/Advance Care Plan: no 
                      
Transition of Care Plan:  Unable to interview pt. Called son. Pt is staying with him. His address is [address] [place] [address]. Pt's spouse is pt here in hosp also. She lives in the [facility] assisted living facility. Son  States pt just dc'd from psych hosp. He is independent with adls and amb. No dme. Plan will bed determined once med cleared and psych eval done. If no psych hosp needed likely will go home. Patient has designated ____unable____________________ to participate in his/her discharge plan and to receive any needed information. Name: barrie nickerson Address: 
Phone number: [phone] Care Management Interventions PCP Verified by CM: Yes 
Palliative Care Criteria Met (RRAT>21 & CHF Dx)?: No 
Mode of Transport at Discharge: Other (see comment) Transition of Care Consult (CM Consult): Discharge Planning Discharge Durable Medical Equipment: No 
Physical Therapy Consult: No 
Occupational Therapy Consult: No 
Speech Therapy Consult: No 
Current Support Network: Relative's Home Confirm Follow Up Transport: Family The Patient and/or Patient Representative was Provided with a Choice of Provider and Agrees with the Discharge Plan?: No 
Name of the Patient Representative Who was Provided with a Choice of Provider and Agrees with the Discharge Plan: barrie white Freedom of Choice List was Provided with Basic Dialogue that Supports the Patient's Individualized Plan of Care/Goals, Treatment Preferences and Shares the Quality Data Associated with the Providers?: No 
Discharge Location Discharge Placement: Unable to determine at this time
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
## 2020-12-14 NOTE — PROGRESS NOTES
Pt arrived on unit transported by [facility] AT [facility], RN and CNA. Pt oriented to room. RN Destiny in room to assess. Paged Dr. Monty Duran to inquire about Andrew catheter and request d/c if possible.

## 2020-12-14 NOTE — PROGRESS NOTES
80:  TRANSFER - IN REPORT: 
 
Verbal report received from BONITA Blanco(name) on Chelsey Pepper  being received from ED(unit) for routine progression of care. Report consisted of patients Situation, Background, Assessment and Recommendations(SBAR). Information from the following report(s) SBAR, ED Summary, [facility] and Cardiac Rhythm NSR was reviewed with the receiving nurse. Opportunity for questions and clarification was provided. 0006:  Assessment completed upon patients arrival to unit and care assumed. 0930:  Patient made a comment, \"There is no point in doing all this, Just let me go. \"  Pt. Stating repeatedly, \"Just let me go. \"  
 
1249:  2 gold rings removed from patient left ring finger placed in sterile cup with patient label on cup. Drivers license and [employer] card in [employer]. Sweat shirt; Sweat pants, socks, white T-shirt and underwear are in patient belonging bag. 
 
1000:  Spoke to security will come up and place rings, license and [employer] card in the security office locked up. Bag # [id number] 
 
2420:  Spoke to Son of patient and gave him the patient code of 46. Updated on patient condition and informed that I would call him back when I was able to look more into the plan of care for his father. 1155:  Per ED Physicians note; patient was taking lexapro, ativan prn and vistaril. Will bring to hospitalitist attention d/t concern of O.D. Tox screens were negative in ED which could explain patient lethargy without a positive tox screen. 1340:  Son calling for an updated on patient father and requesting a plan of care. Explained to son, will update when provider sees patient and places orders. 1500:   Patient asking when he can go home, educated patient that it depends on what happens with his tests that are upcoming. 1530:  Patient asking when he can go home again, informed patient d/t his recent attempted suicide he would be speaking with tele-psych. 1545:  Patient asked if he is able to sign himself out; educated patient d/t recent attempted suicide and being on suicide watch that he would need to be cleared by Psych. 1550:  Sutures removed from right temporal/ forehead. 1655:  TRANSFER - OUT REPORT: 
 
Verbal report given to BONITA Dixon(name) on Dallas Marcus  being transferred to 2204(unit) for routine progression of care. Report consisted of patients Situation, Background, Assessment and Recommendations(SBAR). Information from the following report(s) SBAR, ED Summary, Intake/Output, Recent Results and Cardiac Rhythm NSR was reviewed with the receiving nurse. Lines:  
Peripheral IV 12/13/20 Left Forearm (Active) Site Assessment Clean, dry, & intact 12/14/20 1600 Phlebitis Assessment 0 12/14/20 1600 Infiltration Assessment 0 12/14/20 1600 Dressing Status Clean, dry, & intact 12/14/20 1600 Dressing Type Transparent 12/14/20 0930 Hub Color/Line Status Green;Patent; Flushed;Capped 12/14/20 0930 Action Taken Open ports on tubing capped 12/14/20 0930 Alcohol Cap Used Yes 12/14/20 0930 Peripheral IV 12/14/20 Left Antecubital (Active) Site Assessment Clean, dry, & intact 12/14/20 1600 Phlebitis Assessment 0 12/14/20 1600 Infiltration Assessment 0 12/14/20 1600 Dressing Status Clean, dry, & intact 12/14/20 1600 Dressing Type Transparent 12/14/20 0930 Hub Color/Line Status Green;Patent; Flushed;Capped 12/14/20 0930 Action Taken Open ports on tubing capped 12/14/20 0930 Alcohol Cap Used Yes 12/14/20 0930 Opportunity for questions and clarification was provided. 1740:  Patient transported with:  
 Monitor O2 @ 2 liters Registered Nurse 
 
[id number]:  Updated son on Father's condition and that patient was moved to room [phone].
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
## 2020-12-14 NOTE — PROGRESS NOTES
1655 -- Report received from [facility], ICU RN. 
 
1196 -- Patient on the unit, eating dinner, sitter at the bedside, IV fluids infusing per order, flomax given,  A/O x3, no pain or SOB noted, call bell in reach. Bedside shift change report given to Lucille Ashton (oncoming nurse) by Payton Florian RN (offgoing nurse). Report included the following information SBAR, Kardex, Intake/Output, MAR and Recent Results.
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
## 2020-12-14 NOTE — ED TRIAGE NOTES
Pt arrives from home via ems after family attempted to wake patient up from sleep with no success. On arrival patient is unresponsive to speech but reactive to painful stimuli. Pt has patent airway/ gag reflex. Previous seen at [facility]. Staples to right lateral aspect of skull noted.
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
## 2020-12-15 NOTE — PROGRESS NOTES
Spoke with Dr Sigifredo Brandt pt not yet medically cleared. Plan inpt psych, TDO, when cleared. Will need new psych eval at that time.

## 2020-12-15 NOTE — PROGRESS NOTES
Problem: Falls - Risk of 
Goal: *Absence of Falls Description: Document Zaira Demarco Fall Risk and appropriate interventions in the flowsheet. Outcome: Progressing Towards Goal 
Note: Fall Risk Interventions: 
  
 
Mentation Interventions: Adequate sleep, hydration, pain control, Door open when patient unattended, Evaluate medications/consider consulting pharmacy Medication Interventions: Evaluate medications/consider consulting pharmacy, Patient to call before getting OOB, Teach patient to arise slowly Elimination Interventions: Bed/chair exit alarm, Call light in reach, Patient to call for help with toileting needs, Toilet paper/wipes in reach Problem: Infection - Risk of, Urinary Catheter-Associated Urinary Tract Infection Goal: *Absence of infection signs and symptoms Outcome: Progressing Towards Goal 
  
Problem: Suicide Goal: *STG: Remains safe in hospital 
Outcome: Progressing Towards Goal 
Goal: *STG: Seeks staff when feelings of self harm or harm towards others arise Outcome: Progressing Towards Goal 
Goal: *STG:  Verbalizes alternative ways of dealing with maladaptive feelings/behaviors Outcome: Progressing Towards Goal 
Goal: *STG/LTG: Complies with medication therapy Outcome: Progressing Towards Goal 
Goal: *STG/LTG: No longer expresses self destructive or suicidal thoughts Outcome: Progressing Towards Goal 
Goal: Interventions Outcome: Progressing Towards Goal 
 
Problem: Pain Goal: *Control of Pain Outcome: Progressing Towards Goal 
  
Problem: Pressure Injury - Risk of 
Goal: *Prevention of pressure injury Description: Document Narinder Scale and appropriate interventions in the flowsheet. Outcome: Progressing Towards Goal 
Note: Pressure Injury Interventions: Activity Interventions: Pressure redistribution bed/mattress(bed type) Mobility Interventions: HOB 30 degrees or less, Pressure redistribution bed/mattress (bed type) Nutrition Interventions: Document food/fluid/supplement intake, Offer support with meals,snacks and hydration Friction and Shear Interventions: HOB 30 degrees or less
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
## 2020-12-15 NOTE — PROGRESS NOTES
Bedside, Verbal and Written shift change report given to Hood Batista RN (oncoming nurse) by Steffany Ocasio (offgoing nurse). Report included the following information SBAR, Kardex, Intake/Output, MAR and Recent Results. 6335 Initial assessment completed see flow sheet. Patient is AOx4, 18g PIV to rt ac and lft wrist capped. Patient resting in bed, call light in reach, sitter in room,no issues noted. 8324 Due meds given see MAR. Patient resting in bed, call light in reach, sitter in room, no issues noted. 1300 Patient left floor by wheelchair with transport and sitter to MRI. 1415 Patient back in room resting in bed, sitter in room, call light in reach, no issues noted. 1440 Due med given see MAR. Patient resting in bed, sitter in room, call light in reach, no issues noted. 1700 Patient refused POC testing and any coverage, patient states his sugars have been normal today and he doesn't require further monitoring. 1728 Due med given see MAR. Patient resting in bed, sitter in room,call light in reach, no issues noted. 1915 Bedside, Verbal and Written shift change report given to Steffany Ocasio (oncoming nurse) by Janae Mccain (offgoing nurse). Report included the following information SBAR, Kardex, Intake/Output, MAR and Recent Results.

## 2020-12-15 NOTE — PROGRESS NOTES
Dr. Nicole Tompkins paged for BP 84/52, pt on maintenance IV fluid at 75ml/hr BP rechecked to be 91/56 
 
0544: BP 74/42, MD paged. 6904: Order received for 500cc NS bolus, RN to f/u with Dr. Nicole Tompkins 8528: BP improved, MD informed Patient Vitals for the past 4 hrs: 
 Temp Pulse Resp BP SpO2  
12/15/20 0710 (P) 98 °F (36.7 °C)      
12/15/20 0630  72  105/68   
12/15/20 0542  66  (!) 74/42   
12/15/20 0428  67  (!) 91/54   
12/15/20 0354 98.1 °F (36.7 °C) 65 17 (!) 84/52 98 %

## 2020-12-15 NOTE — ROUTINE PROCESS
Bedside shift change report given to BONITA Molina (oncoming nurse) by Claudine Nieto RN (offgoing nurse). Report included the following information SBAR, Procedure Summary, Intake/Output, MAR and Recent Results.

## 2020-12-15 NOTE — ROUTINE PROCESS
Bedside report received from Destiny RN, pt is alert and oriented, he denies any pain and no signs of discomfort noted, affect within normal limits. Assigned sitter at bedside, call light within pt's reach, will continue to monitor. 2227: Pt request for sleep aid, Dr. Lito russ
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
## 2020-12-16 NOTE — ROUTINE PROCESS
Bedside report received on pt while resting in a recliner, no complaint voiced, meds discussed with pt, VSS, call light within pt's reach

## 2020-12-16 NOTE — ROUTINE PROCESS
Bedside shift change report given to Guy Love RN (oncoming nurse) by Sofia Ortiz RN (offgoing nurse). Report included the following information SBAR, Procedure Summary, Intake/Output, MAR and Recent Results.

## 2020-12-16 NOTE — PROGRESS NOTES
Bedside, Verbal and Written shift change report given to Lilibeth Frank RN (oncoming nurse) by Mode Manzo (offgoing nurse). Report included the following information SBAR, Kardex, Intake/Output, MAR and Recent Results. 7541 Initial assessment completed see flow sheet. Patient is AOx4, 18g PIVs capped to lft f arm, lft ac. Patient resting in bed, sitter in room, call light in reach, no issues noted. 2562 Due meds given see MAR. Patient sitting in chair eating breakfast, sitter in room, call light in reach, no issues noted. 1052 Due med given see mAR. Patient sitting in chair, sitter in room, call light in reach, no issues noted. 1130 Patient refused POC testing, would prefer it be BID. Held insulin due to refusal. Perfect serve to Merrick Quiros MD regarding POC refusal Md responded patient may refuse. 1500 Spoke with telepsych MD and gave SBAR on patient, telepsych moved to patient room. 1446 Due med given see MAR. Patient sitting in chair,sitter in room, call light in reach, no issues noted. 1809 Due med given see MAR. Patient resting in bed, call light in reach, sitter in room, no issues noted. 1925 Bedside, Verbal and Written shift change report given to [address] [place] [address] (oncoming nurse) by Gregoria Diaz (offgoing nurse). Report included the following information SBAR, Kardex, Intake/Output, MAR and Recent Results.

## 2020-12-16 NOTE — PROGRESS NOTES
12/16/2020 I left [facility] asking Dr Belia Baltazar if pt is medically stabe for dc and also asked if he was medically ready at time of psych report. I see new order for Psych Consult. [facility] Phone Number is [phone] They will need demograhic sheet, todays progress note that pt is medically cleared, Psych report--faxed to [phone] / [phone]- ask them which fax is better for them. They need a rapid covid test to be negative. Tonye Cogan Franklin Cornelia. Karin Davis RN, BSN [facility] [phone], Pager [phone] 
[email or web address]

## 2020-12-16 NOTE — PROGRESS NOTES
Physician Progress Note Tonya Hernandez 
CSN #:                  [id number] :                       1945 ADMIT DATE:       2020 7:56 PM 
[address] [place] DATE: 
RESPONDING 
PROVIDER #:        Shea Zelaya MD 
 
 
 
 
QUERY TEXT: 
 
Dr. Jared Barclay Pt admitted with AMS. Pt noted to have ingested alcohol and Ativan per Psychiatry consult. If possible, please document in the progress notes and discharge summary if you are evaluating and / or treating any of the following: The medical record reflects the following: 
Risk Factors: 69 yo male with recent history of suicide attempt, depression Clinical Indicators: => Per ED MD  .. ... brought in by EMS with a chief complaint of altered mental status. 
 
=> Per Psych consult  \" Patient states he ingested several capsules of Ativan that is prescribed by his primary care physician and drank alcohol after he had trouble sleeping. Per chart review, patient was brought in with altered mental status and unarousable\" 
 
=>    Treatment:   Suicide precautions, Tele-psychiatric evaluation Thank your time, 
Akosua Carbajal [facility] VARGAS Options provided: 
-- Metabolic encephalopathy currently resolved 
-- Toxic encephalopathy currently resolved -- Encephalopathy due to medications or drugs currently resolved, Please specify -- Toxic metabolic encephalopathy currently resolved 
-- Wernicke?s encephalopathy currently resolved -- Other - I will add my own diagnosis -- Disagree - Not applicable / Not valid -- Disagree - Clinically unable to determine / Unknown 
-- Refer to Clinical Documentation Reviewer PROVIDER RESPONSE TEXT: 
 
This patient has encephalopathy currently resolved due to benzodiazepin Query created by: Julio C Diego on 12/15/2020 3:45 PM 
 
 
Electronically signed by:  Shea Zelaya MD 12/15/2020 9:57 PM

## 2020-12-16 NOTE — DIABETES MGMT
Nutrition Assessment/Glycemic Control Type and Reason for Visit: Initial 
Nutrition Recommendations/Plan: Pt with self reported wt loss since July. Pt would like supplements , will add Ensure Enlive BID. Nutrition Assessment:    
Pt is underweight related to inadequate caloric intake as evidenced by 85% ideal weight and BMI= 20.5kg/m2. Unable to verify unintentional weight loss. Malnutrition Assessment: 
Malnutrition Status: At risk for malnutrition Diabetes Management: Pt with hx of DM , not requiring insulin in the hospital. 
Recent blood glucose:    
 
Lab Results Component Value Date/Time GLUCPOC 121 (H) 12/16/2020 07:35 AM  
 GLUCPOC 128 (H) 12/15/2020 09:53 PM  
Within target range (non-ICU: <140; ICU<180):  Yes Current Insulin regimen: corrective lispro, not requiring Home medication/insulin regimen:n/a  
HbA1c: 5.6 %equivalent  to ave Blood Glucose of 108   mg/dl for 2-3 months prior to admission Adequate glycemic control PTA:   Yes Lab Results Component Value Date/Time Hemoglobin A1c 5.6 12/14/2020 06:19 AM  
Estimated Daily Nutrient Needs: 
Energy (kcal): 1950; Weight Used for Energy Requirements:   current Protein (g): 84; Weight Used for Protein Requirements: Current Fluid (ml/day): 2000; Method Used for Fluid Requirements: 1 ml/kcal 
Nutrition Related Findings:  Pt reports wt loss since July. Diagnosis  Elevated blood sugar  Peripheral neuropathy, toxic  Altered mental status, unspecified  History of attempted suicide  HTN (hypertension)  Type II diabetes mellitus ([facility] [address]  Osteoporosis  Depression Wounds:   
None Current Nutrition Therapies: DIET REGULAR Anthropometric Measures: 
· Height:  6' 1\" (185.4 cm) · Current Body Wt:  70.6 kg (155 lb 10.3 oz) · Ideal Body Wt:  184 lbs:  84.6 % · Adjusted Body Weight:   ; Weight Adjustment for: ·  BMI:     20.5kg/m2 · BMI Category:  Underweight (BMI less than 22) age over 72 Nutrition Diagnosis:  
· Inadequate protein-energy intake related to psychological cause or life stress, other (specify) as evidenced by intake 51-75%, BMI Nutrition Interventions:  
Food and/or Nutrient Delivery: Continue current diet, Start oral nutrition supplement Nutrition Education and Counseling: Education not indicated Coordination of Nutrition Care: Continue to monitor while inpatient Goals: 
Intake will meet >75% of energy and protein requirements. Weight gain 1 lb. per week. Nutrition Monitoring and Evaluation:  
Behavioral-Environmental Outcomes:   
Food/Nutrient Intake Outcomes: Food and nutrient intake, Supplement intake Physical Signs/Symptoms Outcomes: Weight Discharge Planning:   
No discharge needs at this time Electronically signed by Елена Cotto RD on 12/16/2020 at 2:19 PM

## 2020-12-16 NOTE — PROGRESS NOTES
Problem: Falls - Risk of 
Goal: *Absence of Falls Description: Document Ching Patel Fall Risk and appropriate interventions in the flowsheet. Outcome: Progressing Towards Goal 
Note: Fall Risk Interventions: 
Mobility Interventions: Bed/chair exit alarm, Communicate number of staff needed for ambulation/transfer, Patient to call before getting OOB Mentation Interventions: Adequate sleep, hydration, pain control, Bed/chair exit alarm, Family/sitter at bedside Medication Interventions: Evaluate medications/consider consulting pharmacy, Patient to call before getting OOB, Teach patient to arise slowly Elimination Interventions: Bed/chair exit alarm, Call light in reach, Patient to call for help with toileting needs, Toilet paper/wipes in reach, Urinal in reach Problem: Suicide Goal: *STG: Remains safe in hospital 
Outcome: Progressing Towards Goal 
Goal: *STG: Seeks staff when feelings of self harm or harm towards others arise Outcome: Progressing Towards Goal 
Goal: *STG:  Verbalizes alternative ways of dealing with maladaptive feelings/behaviors Outcome: Progressing Towards Goal 
Goal: *STG/LTG: Complies with medication therapy Outcome: Progressing Towards Goal 
Goal: *STG/LTG: No longer expresses self destructive or suicidal thoughts Outcome: Progressing Towards Goal 
Goal: *LTG:  Identifies available community resources Outcome: Progressing Towards Goal 
Goal: *LTG:  Develops proactive suicide prevention plan Outcome: Progressing Towards Goal 
Goal: Interventions Outcome: Progressing Towards Goal

## 2020-12-17 NOTE — PROGRESS NOTES
Called [facility] for TDO. Faxed pt info. They will contact son and contact nursing floor Updated BONITA tee Pt needs Covid screening

## 2020-12-17 NOTE — ROUTINE PROCESS
Bedside and Verbal shift change report given to [facility] RN (oncoming nurse) by Grace Foss (offgoing nurse). Report included the following information SBAR, MAR and Recent Results. Pt calm, sitter present at time of turnover. 2000 Called [facility] regarding pt placement per case management note. Face sheet, H&P, nursing notes, recent labs, telepsych consult faxed ([phone]) to [facility] at [place] [address] for prescreen. 2100 Attempted to do a [facility] Doxy session with pt, but pt became distraught and stated, \"I can't do this right now. \" Pt hyperventilating, agitated, extremely anxious. Doxy session with [facility] deferred at this time. 2130 Pt requested to see spouse as he had previously during the day. Pt contracted for safety with this RN. Pt permitted to sit with wife in presence of Alyse Duenas. Remainder of shift uneventful, however, pt remained in an easily observable state of heightened anxiety throughout. Bedside and Verbal shift change report given to Rodrigue Mattson (oncoming nurse) by [facility] RN (offgoing nurse). Report included the following information SBAR, Intake/Output, MAR and Recent Results.

## 2020-12-17 NOTE — PROGRESS NOTES
conducted a Follow up consultation and Spiritual Assessment for Lis Valdez, who is a 70 y. o.,male. The  provided the following Interventions: 
Continued the relationship of care and support. Listened empathically. Offered prayer and assurance of continued prayer on patients behalf. Chart reviewed. The following outcomes were achieved: 
Patient expressed gratitude for pastoral care visit. Assessment: 
There are no further spiritual or Worship issues which require Spiritual Care Services interventions at this time. Plan: 
Chaplains will continue to follow and will provide pastoral care on an as needed/requested basis.  recommends bedside caregivers page  on duty if patient shows signs of acute spiritual or emotional distress. Per patient, sad, hurt, frustrated and uncertain. Patient's wife adjacent in unit. Very emotional in not knowing what will happen to his wife as well as himself. Patient has defeating behaviors but trying to be hopeful. Connected to higher power. Grateful for staff and support. [facility] Staff  Spiritual Care  
[phone]

## 2020-12-17 NOTE — PROGRESS NOTES
Received report from Rupinder Coles, [facility]. Sitter at bedside. Pt awake, requesting to visit wife in nearby room. 1640: Heather Causey [phone] from Emergency Services called with a Doxy link not compatible w/[employer]'s tablets, which are set up for Zoom. [email or web address] given, however, Ms. Caterina Bingham will attempt another way to reach pt. 
 
1710: Heather Causey from [phone] meeting w/pt at this time. 1840: NASRIN-COVID test administered and sent for processing. 1847Atamera Cote from  will follow up with night RN, pt would have to d/c to geriatric facility if he were to go. Ms. Caterina Bingham notes that pt's is \"too calm\". 1932: Bedside shift change report given to Sharon (oncoming nurse) by Suzi Roberson (offgoing nurse). Report included the following information SBAR, Kardex, Procedure Summary and Quality Measures.
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
## 2020-12-17 NOTE — PROGRESS NOTES
Results for Austin Deluca (MRN [id number]) as of 12/16/2020 20:55 Ref. Range 12/13/2020 20:00 12/14/2020 06:19 12/15/2020 04:25 WBC Latest Ref Range: 4.6 - 13.2 K/uL 8.7 11.9 10.9 RBC Latest Ref Range: 4.70 - 5.50 M/uL 4.14 (L) 4.16 (L) 3.82 (L) HGB Latest Ref Range: 13.0 - 16.0 g/dL 12.6 (L) 12.6 (L) 11.5 (L) HCT Latest Ref Range: 36.0 - 48.0 % 38.6 39.1 35.8 (L) MCV Latest Ref Range: 74.0 - 97.0 FL 93.2 94.0 93.7 MCH Latest Ref Range: 24.0 - 34.0 PG 30.4 30.3 30.1 MCHC Latest Ref Range: 31.0 - 37.0 g/dL 32.6 32.2 32.1 RDW Latest Ref Range: 11.6 - 14.5 % 14.3 14.4 14.6 (H) PLATELET Latest Ref Range: 135 - 420 K/uL 287 259 252 MPV Latest Ref Range: 9.2 - 11.8 FL 9.6 9.4 9.9 NEUTROPHILS Latest Ref Range: 40 - 73 % 60 83 (H) 76 (H) LYMPHOCYTES Latest Ref Range: 21 - 52 % 29 12 (L) 14 (L) MONOCYTES Latest Ref Range: 3 - 10 % 9 4 9  
EOSINOPHILS Latest Ref Range: 0 - 5 % 2 1 1  
BASOPHILS Latest Ref Range: 0 - 2 % 0 0 0 DF Latest Units:   AUTOMATED AUTOMATED AUTOMATED  
ABS. NEUTROPHILS Latest Ref Range: 1.8 - 8.0 K/UL 5.2 9.9 (H) 8.4 (H)  
ABS. LYMPHOCYTES Latest Ref Range: 0.9 - 3.6 K/UL 2.5 1.4 1.5  
ABS. MONOCYTES Latest Ref Range: 0.05 - 1.2 K/UL 0.8 0.5 0.9  
ABS. EOSINOPHILS Latest Ref Range: 0.0 - 0.4 K/UL 0.2 0.1 0.1 ABS. BASOPHILS Latest Ref Range: 0.0 - 0.1 K/UL 0.0 0.0 0.0 Results for Austin Deluca (MRN [id number]) as of 12/16/2020 20:55 Ref. Range 12/13/2020 20:15 Color Latest Units:   YELLOW Appearance Latest Units:   CLEAR Specific gravity Latest Ref Range: 1.005 - 1.030   1.014  
pH (UA) Latest Ref Range: 5.0 - 8.0   7.5 Protein Latest Ref Range: NEG mg/dL Negative Glucose Latest Ref Range: NEG mg/dL Negative Ketone Latest Ref Range: NEG mg/dL Negative Blood Latest Ref Range: NEG   Negative Bilirubin Latest Ref Range: NEG   Negative Urobilinogen Latest Ref Range: 0.2 - 1.0 EU/dL 0.2 Nitrites Latest Ref Range: NEG   Negative Leukocyte Esterase Latest Ref Range: NEG   Negative Results for Mp Schilling (MRN [id number]) as of 12/16/2020 20:55 Ref. Range 12/13/2020 20:00 12/13/2020 20:15 12/14/2020 06:19 12/15/2020 04:25 12/16/2020 10:25 Sodium Latest Ref Range: 136 - 145 mmol/L 138  139 136 Potassium Latest Ref Range: 3.5 - 5.5 mmol/L 3.9  4.0 4.3 Chloride Latest Ref Range: 100 - 111 mmol/L 107  105 106 CO2 Latest Ref Range: 21 - 32 mmol/L 27  28 24 Anion gap Latest Ref Range: 3.0 - 18 mmol/L 4  6 6 Glucose Latest Ref Range: 74 - 99 mg/dL 91  98 109 (H) BUN Latest Ref Range: 7.0 - 18 MG/DL 18  15 19 (H) Creatinine Latest Ref Range: 0.6 - 1.3 MG/DL 0.78  0.76 0.97   
BUN/Creatinine ratio Latest Ref Range: 12 - 20   23 (H)  20 20 Calcium Latest Ref Range: 8.5 - 10.1 MG/DL 8.5  8.7 8.3 (L) Phosphorus Latest Ref Range: 2.5 - 4.9 MG/DL    3.5 Magnesium Latest Ref Range: 1.6 - 2.6 mg/dL 2.0   2.6 GFR est non-AA Latest Ref Range: >60 ml/min/1.73m2 >60  >60 >60   
GFR est AA Latest Ref Range: >60 ml/min/1.73m2 >60  >60 >60 Bilirubin, total Latest Ref Range: 0.2 - 1.0 MG/DL 0.4  0.6 0.6 Bilirubin, direct Latest Ref Range: 0.0 - 0.2 MG/DL    0.1 Protein, total Latest Ref Range: 6.4 - 8.2 g/dL 5.9 (L)  5.7 (L) 5.1 (L) Albumin Latest Ref Range: 3.4 - 5.0 g/dL 3.0 (L)  3.0 (L) 2.6 (L) Globulin Latest Ref Range: 2.0 - 4.0 g/dL 2.9  2.7 2.5 A-G Ratio Latest Ref Range: 0.8 - 1.7   1.0  1.1 1.0 ALT Latest Ref Range: 16 - 61 U/L 44  38 67 (H) AST Latest Ref Range: 10 - 38 U/L 19  14 64 (H) Alk. phosphatase Latest Ref Range: 45 - 117 U/L 56  56 70 Triglyceride Latest Ref Range: <150 MG/DL     97 Cholesterol, total Latest Ref Range: <200 MG/DL     162 HDL Cholesterol Latest Ref Range: 40 - 60 MG/DL     65 (H) CHOL/HDL Ratio Latest Ref Range: 0 - 5.0       2.5 VLDL, calculated Latest Units: MG/DL     19.4 LDL, calculated Latest Ref Range: 0 - 100 MG/DL     77.6 CK Latest Ref Range: 39 - 308 U/L 65 CK-MB Index Latest Ref Range: 0.0 - 4.0 % CALCULATION NOT PERFORMED WHEN RESULT IS BELOW LINEAR LIMIT      
CK - MB Latest Ref Range: <3.6 ng/ml <1.0 Troponin-I, Qt. Latest Ref Range: 0.0 - 0.045 NG/ML <0.02 Hemoglobin A1c, (calculated) Latest Ref Range: 4.2 - 5.6 %   5.6 Est. average glucose Latest Units: mg/dL   114 Ammonia Latest Ref Range: 11 - 32 UMOL/L  19 Results for Hailey Wilder (MRN [id number]) as of 12/16/2020 20:55 Ref. Range 12/13/2020 20:00 12/13/2020 20:15  
ALCOHOL(ETHYL),SERUM Latest Ref Range: 0 - 3 MG/DL <3 AMPHETAMINES Latest Ref Range: NEG    Negative BARBITURATES Latest Ref Range: NEG    Negative BENZODIAZEPINES Latest Ref Range: NEG    Negative COCAINE Latest Ref Range: NEG    Negative HDSCOM Unknown  (NOTE) METHADONE Latest Ref Range: NEG    Negative OPIATES Latest Ref Range: NEG    Negative PCP(PHENCYCLIDINE) Latest Ref Range: NEG    Negative THC (TH-CANNABINOL) Latest Ref Range: NEG    Negative Results for Hailey Wilder (MRN [id number]) as of 12/16/2020 20:55 Ref. Range 12/13/2020 20:00 12/13/2020 20:15  
ALCOHOL(ETHYL),SERUM Latest Ref Range: 0 - 3 MG/DL <3 AMPHETAMINES Latest Ref Range: NEG    Negative BARBITURATES Latest Ref Range: NEG    Negative BENZODIAZEPINES Latest Ref Range: NEG    Negative COCAINE Latest Ref Range: NEG    Negative HDSCOM Unknown  (NOTE) METHADONE Latest Ref Range: NEG    Negative OPIATES Latest Ref Range: NEG    Negative PCP(PHENCYCLIDINE) Latest Ref Range: NEG    Negative THC (TH-CANNABINOL) Latest Ref Range: NEG    Negative

## 2020-12-18 NOTE — PROGRESS NOTES
Bedside, Verbal and Written shift change report given to 175 [facility]  (oncoming nurse) by Tigre Hernandez (offgoing nurse). Report included the following information SBAR, Kardex, Intake/Output, MAR and Recent Results. 6434 Initial assessment completed see flow sheet. Patient is AOx4,18g PIV capped to lft f arm. Patient resting in bed, sitter in room, call light in reach, no issues noted. 0845 Due meds given see MAR. Patient resting in bed, sitter in room, call light in reach, no issues noted. 900 [facility] report to  Psych [phone] admitting MD Nate Coates. Report called to RN Romina Conti. Report consisted of patients Situation, Background, Assessment and Recommendations(SBAR). Information from the following report(s) SBAR, Kardex, Intake/Output, MAR and Recent Results was reviewed with the receiving nurse. 1500 EMTALA completed. Patient PIV dcd well tolerated. 1515 Patient personal property and chart given to [facility] patient prepared to transfer. 1525 patient left floor via wheelchair with [place] and [facility] for transport to [facility]. Patient care relinquished.
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
## 2020-12-18 NOTE — PROGRESS NOTES
12/18/2020 [facility] is working with pt to get tdo facility. Last covid test was  yesterday, so that is still good. I called to [facility] and asked if there was anything more we needed to sent and the person answering said no, they are waiting for the police to come and that pt is going to [address] [facility] [address]. Nurse Neil Yang has already given report. She says she will find out were pts home medication ( in a blue bag per ER) are being kept. The ER does not still have the Blue Bag with home medications. They state the bag was taken upstairs with pt on admission to unit. I do see that pt had 2 gold rings, Drivers license and anthem card  And clothing were given to security Bag [id number]. Kateryna Fairly. Joaquina Day RN, BSN [facility] [phone], Pager [phone] 
[email or web address]

## 2020-12-18 NOTE — PROGRESS NOTES
Bedside shift report received from [address] [place] [address]: AOX4, on room air, resting in bed, in no apparent distress; shift assessment done; no needs voiced; sitter at bedside 2133: meds given; no apparent distress noted 2330: quietly resting in bed; sitter at bedside 0030: resting in bed; sitter at bedside 0130: sleeping; sitter at bedside 0330: anxious at times jonathan on how her wife is doing; sitter at bedside 
 
0500: resting; sitter at bedside 
 
0700: resting in bed, sitter at bedside; no significant events noted throughout the night' Bedside and Verbal shift change report given to Pawel Harrell RN (oncoming nurse) by Ashley Gustafson RN (offgoing nurse). Report included the following information SBAR, Kardex, Intake/Output and Recent Results.

## 2020-12-19 NOTE — PROGRESS NOTES
2120: In chart of this recently discharged patient to document critical lab value of a positive blood culture, as was communicated to Juilo Hayes, RN Nursing supervisor on 12/18/2020 ar 4016 by  [employer]. Kp Claros, RN Nursing Supervisor called the hospitalist, Dr. Cordelia Perdomo, and informed him of the above result at 2054. No new orders received.

## 2020-12-22 NOTE — ADT AUTH CERT NOTES
PREVIOUSLY DENIED FOR INPATIENT DOWNGRADED TO OBSERVATION REF # [id number] PLEASE FAX FORM  OR CALL BACK TO NOTIFY IF  AUTHORIZATION FOR OBSERVATION IS OR IS NOT REQUIRED  PHONE # [phone]  FAX # [phone]